# Patient Record
Sex: FEMALE | Race: ASIAN | URBAN - METROPOLITAN AREA
[De-identification: names, ages, dates, MRNs, and addresses within clinical notes are randomized per-mention and may not be internally consistent; named-entity substitution may affect disease eponyms.]

---

## 2023-11-29 LAB
HEP B, EXTERNAL RESULT: NEGATIVE
HIV, EXTERNAL RESULT: NEGATIVE

## 2024-01-05 LAB
ABO, EXTERNAL RESULT: NORMAL
RH FACTOR, EXTERNAL RESULT: POSITIVE
RUBELLA TITER, EXTERNAL RESULT: NORMAL

## 2024-06-14 LAB — GBS, EXTERNAL RESULT: NEGATIVE

## 2024-07-06 ENCOUNTER — HOSPITAL ENCOUNTER (INPATIENT)
Facility: HOSPITAL | Age: 34
LOS: 3 days | Discharge: HOME | End: 2024-07-09
Attending: OBSTETRICS & GYNECOLOGY | Admitting: OBSTETRICS & GYNECOLOGY
Payer: COMMERCIAL

## 2024-07-06 PROBLEM — Z37.9 NORMAL LABOR: Status: ACTIVE | Noted: 2024-07-06

## 2024-07-06 LAB
ERYTHROCYTE [DISTWIDTH] IN BLOOD BY AUTOMATED COUNT: 13.2 % (ref 11.5–14.5)
HCT VFR BLD AUTO: 34.4 % (ref 35–47)
HGB BLD-MCNC: 11.9 G/DL (ref 11.5–16)
MCH RBC QN AUTO: 31.4 PG (ref 26–34)
MCHC RBC AUTO-ENTMCNC: 34.6 G/DL (ref 30–36.5)
MCV RBC AUTO: 90.8 FL (ref 80–99)
NRBC # BLD: 0 K/UL (ref 0–0.01)
NRBC BLD-RTO: 0 PER 100 WBC
PLATELET # BLD AUTO: 118 K/UL (ref 150–400)
PMV BLD AUTO: 12 FL (ref 8.9–12.9)
RBC # BLD AUTO: 3.79 M/UL (ref 3.8–5.2)
WBC # BLD AUTO: 11.2 K/UL (ref 3.6–11)

## 2024-07-06 PROCEDURE — 36415 COLL VENOUS BLD VENIPUNCTURE: CPT

## 2024-07-06 PROCEDURE — 1100000000 HC RM PRIVATE

## 2024-07-06 PROCEDURE — 86901 BLOOD TYPING SEROLOGIC RH(D): CPT

## 2024-07-06 PROCEDURE — 4A1HXCZ MONITORING OF PRODUCTS OF CONCEPTION, CARDIAC RATE, EXTERNAL APPROACH: ICD-10-PCS | Performed by: OBSTETRICS & GYNECOLOGY

## 2024-07-06 PROCEDURE — 85027 COMPLETE CBC AUTOMATED: CPT

## 2024-07-06 PROCEDURE — 86592 SYPHILIS TEST NON-TREP QUAL: CPT

## 2024-07-06 PROCEDURE — 86900 BLOOD TYPING SEROLOGIC ABO: CPT

## 2024-07-06 PROCEDURE — 86850 RBC ANTIBODY SCREEN: CPT

## 2024-07-06 PROCEDURE — 6360000002 HC RX W HCPCS: Performed by: ADVANCED PRACTICE MIDWIFE

## 2024-07-06 PROCEDURE — 7210000100 HC LABOR FEE PER 1 HR: Performed by: OBSTETRICS & GYNECOLOGY

## 2024-07-06 RX ORDER — METHYLERGONOVINE MALEATE 0.2 MG/ML
200 INJECTION INTRAVENOUS PRN
Status: DISCONTINUED | OUTPATIENT
Start: 2024-07-06 | End: 2024-07-07

## 2024-07-06 RX ORDER — MISOPROSTOL 200 UG/1
400 TABLET ORAL PRN
Status: DISCONTINUED | OUTPATIENT
Start: 2024-07-06 | End: 2024-07-07

## 2024-07-06 RX ORDER — PROCHLORPERAZINE EDISYLATE 5 MG/ML
10 INJECTION INTRAMUSCULAR; INTRAVENOUS EVERY 6 HOURS PRN
Status: DISCONTINUED | OUTPATIENT
Start: 2024-07-06 | End: 2024-07-09 | Stop reason: HOSPADM

## 2024-07-06 RX ORDER — HYDROMORPHONE HYDROCHLORIDE 1 MG/ML
1 INJECTION, SOLUTION INTRAMUSCULAR; INTRAVENOUS; SUBCUTANEOUS
Status: DISCONTINUED | OUTPATIENT
Start: 2024-07-06 | End: 2024-07-07

## 2024-07-06 RX ORDER — SODIUM CHLORIDE, SODIUM LACTATE, POTASSIUM CHLORIDE, AND CALCIUM CHLORIDE .6; .31; .03; .02 G/100ML; G/100ML; G/100ML; G/100ML
500 INJECTION, SOLUTION INTRAVENOUS PRN
Status: DISCONTINUED | OUTPATIENT
Start: 2024-07-06 | End: 2024-07-07

## 2024-07-06 RX ORDER — ACETAMINOPHEN 325 MG/1
650 TABLET ORAL EVERY 4 HOURS PRN
Status: DISCONTINUED | OUTPATIENT
Start: 2024-07-06 | End: 2024-07-07

## 2024-07-06 RX ORDER — SODIUM CHLORIDE, SODIUM LACTATE, POTASSIUM CHLORIDE, CALCIUM CHLORIDE 600; 310; 30; 20 MG/100ML; MG/100ML; MG/100ML; MG/100ML
INJECTION, SOLUTION INTRAVENOUS CONTINUOUS
Status: DISCONTINUED | OUTPATIENT
Start: 2024-07-07 | End: 2024-07-07

## 2024-07-06 RX ORDER — ONDANSETRON 4 MG/1
4 TABLET, ORALLY DISINTEGRATING ORAL EVERY 6 HOURS PRN
Status: DISCONTINUED | OUTPATIENT
Start: 2024-07-06 | End: 2024-07-07

## 2024-07-06 RX ORDER — SODIUM CHLORIDE 9 MG/ML
25 INJECTION, SOLUTION INTRAVENOUS PRN
Status: DISCONTINUED | OUTPATIENT
Start: 2024-07-06 | End: 2024-07-07

## 2024-07-06 RX ORDER — ONDANSETRON 2 MG/ML
4 INJECTION INTRAMUSCULAR; INTRAVENOUS EVERY 6 HOURS PRN
Status: DISCONTINUED | OUTPATIENT
Start: 2024-07-06 | End: 2024-07-07

## 2024-07-06 RX ORDER — CARBOPROST TROMETHAMINE 250 UG/ML
250 INJECTION, SOLUTION INTRAMUSCULAR PRN
Status: DISCONTINUED | OUTPATIENT
Start: 2024-07-06 | End: 2024-07-07

## 2024-07-06 RX ORDER — TRANEXAMIC ACID 10 MG/ML
1000 INJECTION, SOLUTION INTRAVENOUS
Status: DISCONTINUED | OUTPATIENT
Start: 2024-07-06 | End: 2024-07-07

## 2024-07-06 RX ORDER — SODIUM CHLORIDE 0.9 % (FLUSH) 0.9 %
5-40 SYRINGE (ML) INJECTION EVERY 12 HOURS SCHEDULED
Status: DISCONTINUED | OUTPATIENT
Start: 2024-07-06 | End: 2024-07-07

## 2024-07-06 RX ORDER — SODIUM CHLORIDE 0.9 % (FLUSH) 0.9 %
5-40 SYRINGE (ML) INJECTION PRN
Status: DISCONTINUED | OUTPATIENT
Start: 2024-07-06 | End: 2024-07-07

## 2024-07-06 RX ORDER — TERBUTALINE SULFATE 1 MG/ML
0.25 INJECTION, SOLUTION SUBCUTANEOUS
Status: DISCONTINUED | OUTPATIENT
Start: 2024-07-06 | End: 2024-07-07

## 2024-07-06 RX ADMIN — PROCHLORPERAZINE EDISYLATE 10 MG: 5 INJECTION INTRAMUSCULAR; INTRAVENOUS at 23:47

## 2024-07-06 RX ADMIN — HYDROMORPHONE HYDROCHLORIDE 1 MG: 1 INJECTION, SOLUTION INTRAMUSCULAR; INTRAVENOUS; SUBCUTANEOUS at 23:47

## 2024-07-06 ASSESSMENT — PAIN DESCRIPTION - LOCATION: LOCATION: ABDOMEN;VAGINA

## 2024-07-06 ASSESSMENT — PAIN DESCRIPTION - DESCRIPTORS: DESCRIPTORS: PRESSURE;OTHER (COMMENT)

## 2024-07-06 ASSESSMENT — PAIN SCALES - GENERAL: PAINLEVEL_OUTOF10: 8

## 2024-07-06 ASSESSMENT — PAIN DESCRIPTION - ORIENTATION: ORIENTATION: LOWER

## 2024-07-07 ENCOUNTER — ANESTHESIA EVENT (OUTPATIENT)
Facility: HOSPITAL | Age: 34
End: 2024-07-07
Payer: COMMERCIAL

## 2024-07-07 ENCOUNTER — ANESTHESIA (OUTPATIENT)
Facility: HOSPITAL | Age: 34
End: 2024-07-07
Payer: COMMERCIAL

## 2024-07-07 LAB
ABO + RH BLD: NORMAL
BLOOD GROUP ANTIBODIES SERPL: NORMAL
RPR SER QL: NONREACTIVE
SPECIMEN EXP DATE BLD: NORMAL

## 2024-07-07 PROCEDURE — 6360000002 HC RX W HCPCS: Performed by: ADVANCED PRACTICE MIDWIFE

## 2024-07-07 PROCEDURE — 3700000025 EPIDURAL BLOCK: Performed by: STUDENT IN AN ORGANIZED HEALTH CARE EDUCATION/TRAINING PROGRAM

## 2024-07-07 PROCEDURE — 7210000100 HC LABOR FEE PER 1 HR: Performed by: OBSTETRICS & GYNECOLOGY

## 2024-07-07 PROCEDURE — 3700000156 HC EPIDURAL ANESTHESIA: Performed by: NURSE ANESTHETIST, CERTIFIED REGISTERED

## 2024-07-07 PROCEDURE — 6370000000 HC RX 637 (ALT 250 FOR IP): Performed by: ADVANCED PRACTICE MIDWIFE

## 2024-07-07 PROCEDURE — 6370000000 HC RX 637 (ALT 250 FOR IP): Performed by: OBSTETRICS & GYNECOLOGY

## 2024-07-07 PROCEDURE — 99212 OFFICE O/P EST SF 10 MIN: CPT

## 2024-07-07 PROCEDURE — 2500000003 HC RX 250 WO HCPCS: Performed by: NURSE ANESTHETIST, CERTIFIED REGISTERED

## 2024-07-07 PROCEDURE — 36415 COLL VENOUS BLD VENIPUNCTURE: CPT

## 2024-07-07 PROCEDURE — 94761 N-INVAS EAR/PLS OXIMETRY MLT: CPT

## 2024-07-07 PROCEDURE — 2580000003 HC RX 258: Performed by: ADVANCED PRACTICE MIDWIFE

## 2024-07-07 PROCEDURE — 51702 INSERT TEMP BLADDER CATH: CPT

## 2024-07-07 PROCEDURE — 7220000101 HC DELIVERY VAGINAL/SINGLE: Performed by: OBSTETRICS & GYNECOLOGY

## 2024-07-07 PROCEDURE — 0KQM0ZZ REPAIR PERINEUM MUSCLE, OPEN APPROACH: ICD-10-PCS | Performed by: OBSTETRICS & GYNECOLOGY

## 2024-07-07 PROCEDURE — 1120000000 HC RM PRIVATE OB

## 2024-07-07 PROCEDURE — 51701 INSERT BLADDER CATHETER: CPT

## 2024-07-07 PROCEDURE — 6360000002 HC RX W HCPCS: Performed by: NURSE ANESTHETIST, CERTIFIED REGISTERED

## 2024-07-07 RX ORDER — HYDROMORPHONE HYDROCHLORIDE 1 MG/ML
1 INJECTION, SOLUTION INTRAMUSCULAR; INTRAVENOUS; SUBCUTANEOUS
Status: DISCONTINUED | OUTPATIENT
Start: 2024-07-07 | End: 2024-07-09 | Stop reason: HOSPADM

## 2024-07-07 RX ORDER — FERROUS SULFATE 325(65) MG
325 TABLET ORAL 2 TIMES DAILY WITH MEALS
Status: DISCONTINUED | OUTPATIENT
Start: 2024-07-07 | End: 2024-07-09 | Stop reason: HOSPADM

## 2024-07-07 RX ORDER — OXYCODONE HYDROCHLORIDE 5 MG/1
10 TABLET ORAL EVERY 4 HOURS PRN
Status: DISCONTINUED | OUTPATIENT
Start: 2024-07-07 | End: 2024-07-09 | Stop reason: HOSPADM

## 2024-07-07 RX ORDER — ONDANSETRON 2 MG/ML
4 INJECTION INTRAMUSCULAR; INTRAVENOUS EVERY 6 HOURS PRN
Status: DISCONTINUED | OUTPATIENT
Start: 2024-07-07 | End: 2024-07-09 | Stop reason: HOSPADM

## 2024-07-07 RX ORDER — NALOXONE HYDROCHLORIDE 0.4 MG/ML
INJECTION, SOLUTION INTRAMUSCULAR; INTRAVENOUS; SUBCUTANEOUS PRN
Status: DISCONTINUED | OUTPATIENT
Start: 2024-07-07 | End: 2024-07-07

## 2024-07-07 RX ORDER — SODIUM CHLORIDE 9 MG/ML
INJECTION, SOLUTION INTRAVENOUS PRN
Status: DISCONTINUED | OUTPATIENT
Start: 2024-07-07 | End: 2024-07-09 | Stop reason: HOSPADM

## 2024-07-07 RX ORDER — ACETAMINOPHEN 500 MG
1000 TABLET ORAL EVERY 8 HOURS
Status: DISCONTINUED | OUTPATIENT
Start: 2024-07-07 | End: 2024-07-09 | Stop reason: HOSPADM

## 2024-07-07 RX ORDER — LANOLIN/MINERAL OIL
LOTION (ML) TOPICAL PRN
Status: DISCONTINUED | OUTPATIENT
Start: 2024-07-07 | End: 2024-07-09 | Stop reason: HOSPADM

## 2024-07-07 RX ORDER — SODIUM CHLORIDE 0.9 % (FLUSH) 0.9 %
5-40 SYRINGE (ML) INJECTION EVERY 12 HOURS SCHEDULED
Status: DISCONTINUED | OUTPATIENT
Start: 2024-07-07 | End: 2024-07-09 | Stop reason: HOSPADM

## 2024-07-07 RX ORDER — DOCUSATE SODIUM 100 MG/1
100 CAPSULE, LIQUID FILLED ORAL 2 TIMES DAILY
Status: DISCONTINUED | OUTPATIENT
Start: 2024-07-07 | End: 2024-07-09 | Stop reason: HOSPADM

## 2024-07-07 RX ORDER — EPHEDRINE SULFATE/0.9% NACL/PF 25 MG/5 ML
10 SYRINGE (ML) INTRAVENOUS
Status: DISCONTINUED | OUTPATIENT
Start: 2024-07-07 | End: 2024-07-07

## 2024-07-07 RX ORDER — LIDOCAINE HYDROCHLORIDE AND EPINEPHRINE BITARTRATE 20; .01 MG/ML; MG/ML
INJECTION, SOLUTION SUBCUTANEOUS PRN
Status: DISCONTINUED | OUTPATIENT
Start: 2024-07-07 | End: 2024-08-03 | Stop reason: SDUPTHER

## 2024-07-07 RX ORDER — IBUPROFEN 800 MG/1
800 TABLET ORAL EVERY 8 HOURS
Status: DISCONTINUED | OUTPATIENT
Start: 2024-07-07 | End: 2024-07-09 | Stop reason: HOSPADM

## 2024-07-07 RX ORDER — MISOPROSTOL 200 UG/1
800 TABLET ORAL PRN
Status: DISCONTINUED | OUTPATIENT
Start: 2024-07-07 | End: 2024-07-09 | Stop reason: HOSPADM

## 2024-07-07 RX ORDER — OXYCODONE HYDROCHLORIDE 5 MG/1
5 TABLET ORAL EVERY 4 HOURS PRN
Status: DISCONTINUED | OUTPATIENT
Start: 2024-07-07 | End: 2024-07-09 | Stop reason: HOSPADM

## 2024-07-07 RX ORDER — TRANEXAMIC ACID 10 MG/ML
1000 INJECTION, SOLUTION INTRAVENOUS
Status: ACTIVE | OUTPATIENT
Start: 2024-07-07 | End: 2024-07-08

## 2024-07-07 RX ORDER — FENTANYL/BUPIVACAINE/NS/PF 2-1250MCG
10 PLASTIC BAG, INJECTION (ML) INJECTION CONTINUOUS
Status: DISCONTINUED | OUTPATIENT
Start: 2024-07-07 | End: 2024-07-07

## 2024-07-07 RX ORDER — EPHEDRINE SULFATE/0.9% NACL/PF 25 MG/5 ML
5 SYRINGE (ML) INTRAVENOUS PRN
Status: DISCONTINUED | OUTPATIENT
Start: 2024-07-08 | End: 2024-07-07

## 2024-07-07 RX ORDER — BUPIVACAINE HYDROCHLORIDE 2.5 MG/ML
INJECTION, SOLUTION EPIDURAL; INFILTRATION; INTRACAUDAL PRN
Status: DISCONTINUED | OUTPATIENT
Start: 2024-07-07 | End: 2024-08-03 | Stop reason: SDUPTHER

## 2024-07-07 RX ORDER — SIMETHICONE 80 MG
80 TABLET,CHEWABLE ORAL EVERY 6 HOURS PRN
Status: DISCONTINUED | OUTPATIENT
Start: 2024-07-07 | End: 2024-07-09 | Stop reason: HOSPADM

## 2024-07-07 RX ORDER — SODIUM CHLORIDE 0.9 % (FLUSH) 0.9 %
5-40 SYRINGE (ML) INJECTION PRN
Status: DISCONTINUED | OUTPATIENT
Start: 2024-07-07 | End: 2024-07-09 | Stop reason: HOSPADM

## 2024-07-07 RX ORDER — BISACODYL 10 MG
10 SUPPOSITORY, RECTAL RECTAL DAILY PRN
Status: DISCONTINUED | OUTPATIENT
Start: 2024-07-07 | End: 2024-07-09 | Stop reason: HOSPADM

## 2024-07-07 RX ORDER — METHYLERGONOVINE MALEATE 0.2 MG/ML
200 INJECTION INTRAVENOUS PRN
Status: DISCONTINUED | OUTPATIENT
Start: 2024-07-07 | End: 2024-07-09 | Stop reason: HOSPADM

## 2024-07-07 RX ORDER — ONDANSETRON 4 MG/1
4 TABLET, ORALLY DISINTEGRATING ORAL EVERY 6 HOURS PRN
Status: DISCONTINUED | OUTPATIENT
Start: 2024-07-07 | End: 2024-07-09 | Stop reason: HOSPADM

## 2024-07-07 RX ADMIN — IBUPROFEN 800 MG: 800 TABLET, FILM COATED ORAL at 23:51

## 2024-07-07 RX ADMIN — OXYTOCIN 2 MILLI-UNITS/MIN: 10 INJECTION, SOLUTION INTRAMUSCULAR; INTRAVENOUS at 07:50

## 2024-07-07 RX ADMIN — Medication 10 ML/HR: at 11:58

## 2024-07-07 RX ADMIN — IBUPROFEN 800 MG: 800 TABLET, FILM COATED ORAL at 16:05

## 2024-07-07 RX ADMIN — SODIUM CHLORIDE, POTASSIUM CHLORIDE, SODIUM LACTATE AND CALCIUM CHLORIDE: 600; 310; 30; 20 INJECTION, SOLUTION INTRAVENOUS at 01:24

## 2024-07-07 RX ADMIN — BUPIVACAINE HYDROCHLORIDE 10 ML: 2.5 INJECTION, SOLUTION EPIDURAL; INFILTRATION; INTRACAUDAL; PERINEURAL at 02:57

## 2024-07-07 RX ADMIN — LIDOCAINE HYDROCHLORIDE,EPINEPHRINE BITARTRATE 3 ML: 20; .01 INJECTION, SOLUTION INFILTRATION; PERINEURAL at 02:50

## 2024-07-07 RX ADMIN — Medication 10 ML/HR: at 03:30

## 2024-07-07 RX ADMIN — DOCUSATE SODIUM 100 MG: 100 CAPSULE, LIQUID FILLED ORAL at 21:09

## 2024-07-07 RX ADMIN — SODIUM CHLORIDE, POTASSIUM CHLORIDE, SODIUM LACTATE AND CALCIUM CHLORIDE: 600; 310; 30; 20 INJECTION, SOLUTION INTRAVENOUS at 02:36

## 2024-07-07 RX ADMIN — ACETAMINOPHEN 1000 MG: 500 TABLET ORAL at 21:08

## 2024-07-07 RX ADMIN — Medication 166.7 ML: at 14:25

## 2024-07-07 RX ADMIN — FERROUS SULFATE TAB 325 MG (65 MG ELEMENTAL FE) 325 MG: 325 (65 FE) TAB at 16:05

## 2024-07-07 RX ADMIN — ACETAMINOPHEN 650 MG: 325 TABLET ORAL at 13:17

## 2024-07-07 RX ADMIN — SODIUM CHLORIDE, POTASSIUM CHLORIDE, SODIUM LACTATE AND CALCIUM CHLORIDE: 600; 310; 30; 20 INJECTION, SOLUTION INTRAVENOUS at 07:49

## 2024-07-07 ASSESSMENT — PAIN - FUNCTIONAL ASSESSMENT
PAIN_FUNCTIONAL_ASSESSMENT: ACTIVITIES ARE NOT PREVENTED
PAIN_FUNCTIONAL_ASSESSMENT: ACTIVITIES ARE NOT PREVENTED

## 2024-07-07 ASSESSMENT — PAIN DESCRIPTION - ORIENTATION
ORIENTATION: LOWER
ORIENTATION: LOWER

## 2024-07-07 ASSESSMENT — PAIN DESCRIPTION - LOCATION
LOCATION: ABDOMEN
LOCATION: PERINEUM

## 2024-07-07 ASSESSMENT — PAIN SCALES - GENERAL: PAINLEVEL_OUTOF10: 5

## 2024-07-07 ASSESSMENT — PAIN DESCRIPTION - DESCRIPTORS
DESCRIPTORS: SORE;ACHING;BURNING
DESCRIPTORS: ACHING;SORE

## 2024-07-07 NOTE — PROGRESS NOTES
Labor Progress Note  Patient seen, fetal heart rate and contraction pattern evaluated, patient examined.  Comfortable with epidural.    No data found.    Physical Exam:  Cervical Exam:  /-2 AROM scant clear fluid  Uterine Activity: not tracing well  pit at 6 mu/min  Fetal Heart Rate: Reactive  EFW 7 lb by leopold    Assessment/Plan:  IUP at 39w4d labor with pit augmentation    Reassuring fetal status  GBS  neg  Continue to titrate pitocin to achieve regular ctx.  Reposition prn.  Discussion with patient regarding history of shoulder dystocia (8lb 4oz relieved by episiotomy, Kaylyn/SPP/del of posterior arm).  Pt counseled about recurrence and associated risks to mom/baby.  She declines c/s. Heat to perineum per request.  Expect  will prepare for shoulder dystocia given history.    Rohiin Cummings DO, FACOG  [facility]

## 2024-07-07 NOTE — PROGRESS NOTES
2144: JATIN Stevenson CNM at bedside discussing POC.    2145: Assumed care of pt ambulatory from home complaining of contractions Q5-6 minutes. Contractions started night prior and are focused in lower abdominal area. Endorses positive fetal mov't. Denies LOF, vaginal bleeding, headaches, vision changes, epigastric pain, and/or N/V. EFM/toco currently applied.    2202: Pt to be removed from EFM/toco per JATIN Stevenson CNM. Okay to use birthing ball and/or walk george to assist with discomfort. Will reassess cervix in an hour.    2230: Provided pt heat pack for contraction discomfort.    2240: Attempted to place pt back on EFM/toco for 20 minute strip prior to cervical recheck by CNM. Pt refusing EFM/toco. Pt squatting by sink complaining of pain. Notified JATIN Stevenson CNM. To come to bedside to assess.    2253: JATIN Stevenson CNM at bedside to discuss POC and admission to inpatient.    0047: Heat pack provided to pt for back discomfort.    0124: Pt requesting IVF bolus for epidural due to continued discomfort despite IV pain medication. IVF bolus started.    0213: SHELBY Shirley CRNA notified pt finishing up IVF bolus for epidural. To come to bedside.    0236: SHELBY Shirley CRNA at bedside for epidural placement.    0243: Epidural time out conducted with SHELBY Shirley CRNA.    0250: Epidural test dose administered by SHELBY Shirley CRNA.    0257: Epidural bolus dose administered by SHELBY Shirley CRNA.    0700: Bedside and Verbal shift change report given to Randi CASH (oncoming nurse) by Jerrica CASH (offgoing nurse). Report included the following information Nurse Handoff Report, Index, Adult Overview, Intake/Output, MAR, Recent Results, and Quality Measures.

## 2024-07-07 NOTE — PROGRESS NOTES
0700 - Verbal shift change report given to SHAILESH Bryan (oncoming nurse) by SHAILESH Becerril (offgoing nurse). Report included the following information Nurse Handoff Report.     1001 - SVE by Dr. Cummings, patient 5/90/-2. AROM with clear, scant amount of fluid.    1200 - SVE by Dr. Cummings, patient lip/100/+1.     1325 - SVE by Dr. Cummings, patient 10/100/+1.    1330 - Patient actively pushing. RN remains in continuous attendance at the bedside. Assessment & evaluation of fetal heart rate ongoing via continuous EFM.    1421 - RN remained at bedside throughout pushing. EFM continuously assessed. Vaginal delivery of viable infant.    1820 - TRANSFER - OUT REPORT:    Verbal report given to SHAILESH Becerril on Emi Mares being transferred to SHAILESH Bryan for routine progression of patient care       Report consisted of patient's Situation, Background, Assessment and   Recommendations(SBAR).     Information from the following report(s) Nurse Handoff Report was reviewed with the receiving nurse.           Lines:   Peripheral IV 07/06/24 Left;Proximal;Anterior Forearm (Active)   Site Assessment Clean, dry & intact 07/07/24 0717   Line Status Infusing 07/07/24 0717   Line Care Connections checked and tightened 07/07/24 0717   Phlebitis Assessment No symptoms 07/07/24 0717   Infiltration Assessment 0 07/07/24 0717   Alcohol Cap Used Yes 07/07/24 0717   Dressing Status Clean, dry & intact 07/07/24 0717   Dressing Type Transparent 07/07/24 0717   Dressing Intervention New 07/06/24 3939        Opportunity for questions and clarification was provided.      Patient transported with:  Registered Nurse

## 2024-07-07 NOTE — PROGRESS NOTES
Labor Progress Note     Patient: Emi Mares MRN: [id number]  SSN: [id number]    YOB: 1990  Age: 34 y.o.  Sex: female        Subjective:   Patient evaluated and requesting epidural s/p dilaudid 2 hours ago   Objective:   Blood pressure 127/69, pulse 92, temperature 97.9 °F (36.6 °C), temperature source Oral, resp. rate 20, SpO2 95 %.    Sterile Vaginal Exam: deferred  Membranes:  intact  EFM:  - Fetal Heart Rate: reactive     - Uterine Activity: every 5-6 min        Assessment:    SIUP @ 39w4d   GBS neg  Early Labor  Category 1 fetal heart rate tracing   Plan:   Expectant management   Pain management : bolus for epidural   All questions answered, and pt/partner agree with plan of care  Anticipate     Signed By:  BERTHA Barreto CNM      2024 2:14 AM

## 2024-07-07 NOTE — PROGRESS NOTES
Labor Progress Note  Patient seen, fetal heart rate and contraction pattern evaluated, patient examined.  Patient Vitals for the past 2 hrs:   BP Temp Temp src Pulse   07/07/24 1307 120/62 -- -- (!) 102   07/07/24 1252 126/74 -- -- (!) 110   07/07/24 1238 127/61 -- -- (!) 104   07/07/24 1221 120/67 -- -- 87   07/07/24 1207 124/70 -- -- (!) 102   07/07/24 1200 -- 98 °F (36.7 °C) Oral --   07/07/24 1151 119/74 -- -- 96   07/07/24 1136 121/69 -- -- 85       Physical Exam:  Cervical Exam:  complete/+2  Uterine Activity: q 2 min  Fetal Heart Rate: Reactive    Assessment/Plan:  IUP at 39w4d labor/pit    Reassuring fetal status  GBS neg  Start pushing    Rohini Cummings, DO, FACOG  [facility]

## 2024-07-07 NOTE — DISCHARGE INSTRUCTIONS
pain that does not get better after you take pain medicine.  Sudden chest pain and shortness of breath  Signs of a blood clot: pain/ swelling/ increasing redness in your lower extremeties  Signs of infection: increased pain in your abdomen or vaginal area; red streaks, warmth, or tenderness of your breasts; fever of 100.5 F or greater

## 2024-07-07 NOTE — PROGRESS NOTES
Labor Progress Note  Patient seen, fetal heart rate and contraction pattern evaluated, patient examined.  Patient Vitals for the past 2 hrs:   BP Pulse   07/07/24 1307 120/62 (!) 102   07/07/24 1252 126/74 (!) 110   07/07/24 1238 127/61 (!) 104   07/07/24 1221 120/67 87   07/07/24 1207 124/70 (!) 102       Physical Exam:  Cervical Exam:  c/p  Uterine Activity: q 2-3 min  Fetal Heart Rate: Reactive cat 2 reassuring    Assessment/Plan:  IUP at 39w4d labor/pit    Reassuring fetal status  GBS neg  Pt pushing well, prepped for shoulder dystocia      Rohini Cummings DO, FACOG  [facility]

## 2024-07-07 NOTE — DISCHARGE SUMMARY
Obstetrical Discharge Summary     Name: Emi Mares MRN: [id number]  SSN: [id number]    YOB: 1990  Age: 34 y.o.  Sex: female      Allergies: Patient has no known allergies.    Admit Date: 2024    Discharge Date: 2024     Admitting Physician: Britton Benedict MD     Attending Physician:  Marycruz Johnson MD     * Admission Diagnoses: Normal labor [O80, Z37.9]    * Discharge Diagnoses:   Information for the patient's :  Joe Mares [[id number]]   @[id number]@      Additional Diagnoses:    Lab Results   Component Value Date/Time    ABORH A POSITIVE 2024 11:31 PM      There is no immunization history on file for this patient.    * Procedures:   * No surgery found *           * Discharge Condition: good    * Hospital Course: Normal hospital course following the delivery.    * Disposition: Home    Discharge Medications:      Medication List        ASK your doctor about these medications      PRENATAL 1 + IRON PO              * Follow-up Care/Patient Instructions:  Activity: Activity as tolerated  Diet: Regular Diet  Wound Care: As directed    [unfilled]

## 2024-07-07 NOTE — H&P
History & Physical    Name: Emi Mares MRN: [id number]  SSN: [id number]    YOB: 1990  Age: 34 y.o.  Sex: female        Subjective:     Estimated Date of Delivery: 7/10/24  OB History          4    Para        Term                AB   2    Living   1         SAB        IAB        Ectopic        Molar        Multiple        Live Births                    Ms. Mares is admitted with pregnancy at 39w3d with complaints of contractions since last night. Reports they were every 15 min last night, every 30 min throughout the day today and then reports every 8 min this evening. Denies LOF, bleeding. +FM . Prenatal course was normal. Please see prenatal records for details.    Past Medical History:   Diagnosis Date    History of shoulder dystocia in prior pregnancy      Past Surgical History:   Procedure Laterality Date    EYE SURGERY      eye injury repair as a child    WISDOM TOOTH EXTRACTION       Social History     Occupational History    Not on file   Tobacco Use    Smoking status: Never    Smokeless tobacco: Never   Substance and Sexual Activity    Alcohol use: Not Currently    Drug use: Never    Sexual activity: Yes     No family history on file.    No Known Allergies  Prior to Admission medications    Medication Sig Start Date End Date Taking? Authorizing Provider   Prenatal Multivit-Min-Fe-FA (PRENATAL 1 + IRON PO) Take by mouth   Yes Provider, Shantell, MD        Review of Systems: Pertinent items are noted in HPI.    Objective:     Vitals:  BP: 123/65  HR 84  SPO2 97%    Physical Exam:  Patient with distress  Heart: Regular rate and rhythm  Lung: no wheezes, no rales, no rhonchi and normal respiratory effort  Back costovertebral angle tenderness: absent  Abdomen: soft, nontender  Fundus: soft and non tender  Perineum: blood absent, amniotic fluid absent  Cervical Exam: 1 cm dilated    50% effaced    -3 station    Presenting Part: cephalic  Cervical Position: posterior  Lower

## 2024-07-07 NOTE — PROGRESS NOTES
Labor Progress Note     Patient: Emi Mares MRN: [id number]  SSN: [id number]    YOB: 1990  Age: 34 y.o.  Sex: female        Subjective:   Patient evaluated and is coping well with contractions, resting comfortably with epidural   Objective:   Blood pressure (!) 110/46, pulse 96, temperature 97.9 °F (36.6 °C), temperature source Oral, resp. rate 18, height 1.676 m (5' 6\"), weight 76.2 kg (168 lb), SpO2 94 %.    Sterile Vaginal Exam: deferred  Membranes:  intact  EFM:  - Fetal Heart Rate:reactive     - Uterine Activity: irregular        Assessment:    SIUP @ 39w4d  GBS neg  Labor  Category 1 fetal heart rate tracing   Plan:   Contractions spaced, discussed risks/benefits of AOL with pitocin.   Patient agrees to AOL with pitocin   Titrate pitocin to achieve adequate contractions   All questions answered, and pt/partner agree with plan of care  Anticipate     Signed By:  BERTHA Barreto CNM      2024 4:57 AM

## 2024-07-07 NOTE — ANESTHESIA PRE PROCEDURE
Department of Anesthesiology  Preprocedure Note       Name:  Emi Mares   Age:  34 y.o.  :  1990                                          MRN:  [id number]         Date:  2024      Surgeon: * No surgeons listed *    Procedure: * No procedures listed *    Medications prior to admission:   Prior to Admission medications    Medication Sig Start Date End Date Taking? Authorizing Provider   Prenatal Multivit-Min-Fe-FA (PRENATAL 1 + IRON PO) Take by mouth   Yes Provider, MD Shantell       Current medications:    Current Facility-Administered Medications   Medication Dose Route Frequency Provider Last Rate Last Admin   • terbutaline (BRETHINE) injection 0.25 mg  0.25 mg SubCUTAneous Once PRN Deloglos, Stephenia, APRN - CNM       • lactated ringers IV soln infusion   IntraVENous Continuous Louieogldominga, Stephenia, APRN -  mL/hr at 24 0124 New Bag at 24 0124   • lactated ringers bolus 500 mL  500 mL IntraVENous PRN Delogldominga, Stephenia, APRN - CNM        Or   • lactated ringers bolus 500 mL  500 mL IntraVENous PRN Deloglos, Stephenia, APRN - CNM       • sodium chloride flush 0.9 % injection 5-40 mL  5-40 mL IntraVENous 2 times per day Deloglos, Stephenia, APRN - CNM       • sodium chloride flush 0.9 % injection 5-40 mL  5-40 mL IntraVENous PRN Deloglos, Stephenia, APRN - CNM       • 0.9 % sodium chloride infusion  25 mL IntraVENous PRN Deloglos, Stephenia, APRN - CNM       • ondansetron (ZOFRAN) injection 4 mg  4 mg IntraVENous Q6H PRN Deloglos, Stephenia, APRN - CNM        Or   • ondansetron (ZOFRAN-ODT) disintegrating tablet 4 mg  4 mg Oral Q6H PRN Deloglos, Stephenia, APRN - CNM       • oxytocin (PITOCIN) 30 units in 500 mL infusion  87.3 julieta-units/min IntraVENous Continuous PRN Deloglos, Stephenia, APRN - CNM        And   • oxytocin (PITOCIN) 10 unit bolus from the bag  10 Units IntraVENous PRN Deloglos, Stephenia, APRN - CNM       • methylergonovine (METHERGINE) injection 200 mcg  200

## 2024-07-07 NOTE — PROGRESS NOTES
Pt presented with complaints contractions 5-6 mins apart. Denies ROM or decreased fetal movement.  CNM notified of pt arrival and SVE and contractions 5-6 mins apart.

## 2024-07-07 NOTE — ANESTHESIA PROCEDURE NOTES
Epidural Block    Patient location during procedure: OB  Start time: 7/7/2024 2:43 AM  End time: 7/7/2024 3:00 AM  Reason for block: labor epidural  Staffing  Resident/CRNA: Evelio Shirley APRN - CRNA  Performed by: Evelio Shirley APRN - CRNA  Authorized by: Beltran Plata Jr., DO    Epidural  Patient position: sitting  Prep: ChloraPrep  Patient monitoring: continuous pulse ox and frequent blood pressure checks  Approach: midline  Location: L4-5  Injection technique: KEISHA saline  Guidance: paresthesia technique  Provider prep: mask and sterile gloves  Needle  Needle type: Tuohy   Needle gauge: 17 G  Needle length: 3.5 in  Needle insertion depth: 5 cm  Catheter type: multi-orifice  Catheter size: 20 G  Catheter at skin depth: 10 cm  Test dose: negativeCatheter Secured: tegaderm and tape  Assessment  Sensory level: T6  Hemodynamics: stable  Attempts: 1  Outcomes: uncomplicated and patient tolerated procedure well  Preanesthetic Checklist  Completed: patient identified, IV checked, site marked, risks and benefits discussed, surgical/procedural consents, equipment checked, pre-op evaluation, timeout performed, anesthesia consent given, oxygen available, monitors applied/VS acknowledged, fire risk safety assessment completed and verbalized and blood product R/B/A discussed and consented

## 2024-07-07 NOTE — PROGRESS NOTES
Labor Progress Note  Patient seen, fetal heart rate and contraction pattern evaluated, patient examined.  Patient Vitals for the past 2 hrs:   BP Pulse SpO2   07/07/24 1151 119/74 96 --   07/07/24 1136 121/69 85 --   07/07/24 1121 115/72 87 --   07/07/24 1108 117/60 96 --   07/07/24 1052 (!) 93/47 (!) 109 96 %   07/07/24 1036 (!) 101/59 94 --   07/07/24 1022 (!) 99/52 (!) 102 --   07/07/24 1013 110/61 (!) 105 96 %       Physical Exam:  Cervical Exam:  anterior lip/+1  Uterine Activity: not tracing well  Fetal Heart Rate: Reactive cat 1    Assessment/Plan:  IUP at 39w4d labor/pit aug    Reassuring fetal status  GBS neg    Rohini Cummings DO, FACOG  [facility]

## 2024-07-08 PROCEDURE — 1120000000 HC RM PRIVATE OB

## 2024-07-08 PROCEDURE — 6370000000 HC RX 637 (ALT 250 FOR IP): Performed by: OBSTETRICS & GYNECOLOGY

## 2024-07-08 PROCEDURE — 2700000000 HC OXYGEN THERAPY PER DAY

## 2024-07-08 PROCEDURE — 94761 N-INVAS EAR/PLS OXIMETRY MLT: CPT

## 2024-07-08 RX ADMIN — DOCUSATE SODIUM 100 MG: 100 CAPSULE, LIQUID FILLED ORAL at 08:52

## 2024-07-08 RX ADMIN — IBUPROFEN 800 MG: 800 TABLET, FILM COATED ORAL at 08:52

## 2024-07-08 RX ADMIN — IBUPROFEN 800 MG: 800 TABLET, FILM COATED ORAL at 17:04

## 2024-07-08 RX ADMIN — ACETAMINOPHEN 1000 MG: 500 TABLET ORAL at 06:17

## 2024-07-08 RX ADMIN — DOCUSATE SODIUM 100 MG: 100 CAPSULE, LIQUID FILLED ORAL at 21:59

## 2024-07-08 RX ADMIN — ACETAMINOPHEN 1000 MG: 500 TABLET ORAL at 21:59

## 2024-07-08 RX ADMIN — ACETAMINOPHEN 1000 MG: 500 TABLET ORAL at 14:11

## 2024-07-08 ASSESSMENT — PAIN DESCRIPTION - DESCRIPTORS
DESCRIPTORS: ACHING;SORE
DESCRIPTORS: CRAMPING;DISCOMFORT

## 2024-07-08 ASSESSMENT — PAIN DESCRIPTION - LOCATION
LOCATION: ABDOMEN
LOCATION: ABDOMEN

## 2024-07-08 ASSESSMENT — PAIN SCALES - GENERAL
PAINLEVEL_OUTOF10: 4
PAINLEVEL_OUTOF10: 4

## 2024-07-08 ASSESSMENT — PAIN DESCRIPTION - ORIENTATION
ORIENTATION: LOWER
ORIENTATION: LOWER

## 2024-07-08 NOTE — PROGRESS NOTES
PostPartum Note    Emi Mares  [id number]  1990  34 y.o.    S:  Ms. Emi Mares is a 34 y.o.  PPD #1 s/p  @ 39w4d.  Doing well.  She had a baby boy.  Her lochia is like a period.  She describes her pain as mild and is well controlled with PO medications. She is ambulating and voiding.  Tolerating PO intake.      O:   /75   Pulse (!) 101   Temp 98.2 °F (36.8 °C) (Oral)   Resp 16   Ht 1.676 m (5' 6\")   Wt 76.2 kg (168 lb)   SpO2 99%   Breastfeeding Unknown   BMI 27.12 kg/m²     Gen - No acute distress  Respirations - nonlabored   Abdomen - Fundus firm below the umbilicus   Ext - Warm, well perfused, nontender     Lab Results   Component Value Date/Time    WBC 11.2 2024 11:31 PM    HGB 11.9 2024 11:31 PM    HCT 34.4 2024 11:31 PM     2024 11:31 PM    MCV 90.8 2024 11:31 PM     A/P:  34 y.o.  PPD #1 s/p  @ 39w4d doing well.    1.  Routine PP instructions/ care discussed  2.  Blood type - Rh pos  3.  Rubella immune  4.  Circumcision not desired  5.  Discharge PPD 2   6.  F/U 4-6 weeks for PP check.        Viviane Carias MD  [facility]

## 2024-07-08 NOTE — CARE COORDINATION
7/8/2024  2:31 PM    CM met with HJON to complete initial assessment and begin discharge planning.  MOB verified and confirmed demographics.  JHON lives with FOB, at the address on file.  JHON reports she has good family support, and feels like she has the support she needs when she returns home.  JHON plans to breast feed baby and has pump to use at home.  Bossman chandler, will provide follow up care for infant. JHON has car seat, bassinet/crib, clothing, bottles and all necessary supplies for baby. JHON has Commercial insurance, and will be adding baby to FOB's policy. CM discussed process to add baby to insurance, JHON verbalized understanding.       07/08/24 1431   Service Assessment   Patient Orientation Alert and Oriented   Cognition Alert   History Provided By Patient   Primary Caregiver Self   Support Systems Spouse/Significant Other   PCP Verified by CM Yes   Last Visit to PCP Within last 3 months   Prior Functional Level Independent in ADLs/IADLs   Current Functional Level Independent in ADLs/IADLs   Can patient return to prior living arrangement Yes   Ability to make needs known: Good   Family able to assist with home care needs: Yes   Would you like for me to discuss the discharge plan with any other family members/significant others, and if so, who? No   Financial Resources Other (Comment)     Eloy Vasquez CM

## 2024-07-09 VITALS
DIASTOLIC BLOOD PRESSURE: 68 MMHG | SYSTOLIC BLOOD PRESSURE: 105 MMHG | RESPIRATION RATE: 15 BRPM | HEIGHT: 66 IN | HEART RATE: 89 BPM | WEIGHT: 168 LBS | BODY MASS INDEX: 27 KG/M2 | TEMPERATURE: 97.7 F | OXYGEN SATURATION: 98 %

## 2024-07-09 PROCEDURE — 6370000000 HC RX 637 (ALT 250 FOR IP): Performed by: OBSTETRICS & GYNECOLOGY

## 2024-07-09 PROCEDURE — 94761 N-INVAS EAR/PLS OXIMETRY MLT: CPT

## 2024-07-09 RX ADMIN — DOCUSATE SODIUM 100 MG: 100 CAPSULE, LIQUID FILLED ORAL at 10:04

## 2024-07-09 RX ADMIN — IBUPROFEN 800 MG: 800 TABLET, FILM COATED ORAL at 10:05

## 2024-07-09 RX ADMIN — ACETAMINOPHEN 1000 MG: 500 TABLET ORAL at 07:00

## 2024-07-09 RX ADMIN — IBUPROFEN 800 MG: 800 TABLET, FILM COATED ORAL at 01:59

## 2024-07-09 ASSESSMENT — PAIN DESCRIPTION - ORIENTATION
ORIENTATION: LOWER;UPPER
ORIENTATION: LOWER

## 2024-07-09 ASSESSMENT — PAIN SCALES - GENERAL
PAINLEVEL_OUTOF10: 5
PAINLEVEL_OUTOF10: 5

## 2024-07-09 ASSESSMENT — PAIN DESCRIPTION - DESCRIPTORS
DESCRIPTORS: DISCOMFORT;SORE
DESCRIPTORS: CRAMPING;DISCOMFORT

## 2024-07-09 ASSESSMENT — PAIN DESCRIPTION - LOCATION: LOCATION: ABDOMEN

## 2024-07-09 NOTE — LACTATION NOTE
face and increase optimal latch.      If you or your baby are working through some latch difficulties, syringe feeding is another way of giving  your baby colostrum or breastmilk.  Syringe feeding can be used when you need to give your baby small amounts of colostrum or expressed breast milk (less than 5ml at a time). This is usually during the first couple of days after your baby's birth.     Breastmilk or colostrum can be expressed via hand, or pump and transferred to small sterile periodontal (or other) syringe. Baby should be held in an upright position and gently syringe no more than 0.2mls (millilitres) into your baby’s mouth at a time. Feed the milk in between their gum and cheek.  Offer a clean or gloved finger and allow baby to suck while receiving milk, teaching them to be an active participant in the feeding process. Allow your baby to swallow before giving them another 0.2mls (millilitres) and continue to do this until the feed has ended.     Care for sore/tender nipples discussed:  ways to improve positioning and latch practiced and discussed, hand express colostrum after feedings and let air dry, light application of lanolin, hydrogel pads, seek comfortable laid back feeding position, start feedings on least sore side first.    Pt will successfully establish breastfeeding by feeding in response to early feeding cues   or wake every 3h, will obtain deep latch, and will keep log of feedings/output.  Taught to BF at hunger cues and or q 2-3 hrs and to offer 10-20 drops of hand expressed colostrum at any non-feeds.      Left Breast: Firm, Filling  Left Nipple: Protrude, Sore  Right Nipple: Protrude, Sore  Right Breast: Filling, Firm  Position and Latch: With assistance  Signs of Transfer: Nutritive sucking  Maternal Response: Attentive  Infant Supplementation: Expressed Breast Milk, Finger Fed        Latch: Grasps breast, tongue down, lips flanged, rhythmic sucking  Audible Swallowing: Spontaneous and

## 2024-07-09 NOTE — PROGRESS NOTES
1045 Discharge completed. Awaiting transportation.    Pt off unit in stable condition via wheelchair with volunteers for discharge home per Dr. Bruner. Pt is aware to follow-up in 6 weeks. Pt denies any HA, dizziness, N/V or pain at this time. Infant in car seat and discharged with mother.

## 2024-07-09 NOTE — PROGRESS NOTES
PostPartum Note    Emi Mares  [id number]  1990  34 y.o.    S:  Ms. Emi Mares is a 34 y.o.  PPD #2 s/p  @ 39w4d.  Doing well.  She had a baby boy.  Her lochia is like a period.  She describes her pain as mild and is well controlled with PO medications.   She is ambulating and voiding.  Tolerating PO intake.      O:   /68   Pulse 89   Temp 97.7 °F (36.5 °C) (Oral)   Resp 15   Ht 1.676 m (5' 6\")   Wt 76.2 kg (168 lb)   SpO2 98%   Breastfeeding Unknown   BMI 27.12 kg/m²     Lab Results   Component Value Date/Time    WBC 11.2 2024 11:31 PM    HGB 11.9 2024 11:31 PM    HCT 34.4 2024 11:31 PM     2024 11:31 PM    MCV 90.8 2024 11:31 PM       Gen - No acute distress  Abdomen - Fundus firm, below the umbilicus   Ext - Warm, well perfused.  Nontender    A/P:  PPD #2 s/p  @ 39w4d doing well.    1.  Routine PP instructions/ care discussed  2.  Circumcision not desired   3.  Discharge today   4.  F/U 4-6 weeks for PP check.      Sierra Bruner MD  [facility]

## 2024-08-03 NOTE — ANESTHESIA POSTPROCEDURE EVALUATION
Department of Anesthesiology  Postprocedure Note    Patient: Emi Mares  MRN: [id number]  YOB: 1990  Date of evaluation: 8/3/2024    Procedure Summary       Date: 07/07/24 Room / Location:     Anesthesia Start: 0226 Anesthesia Stop:     Procedure: Labor Analgesia Diagnosis:     Scheduled Providers:  Responsible Provider: Beltran Plata Jr., DO    Anesthesia Type: regional, epidural ASA Status: 2            Anesthesia Type: No value filed.    Susan Phase I:      Susan Phase II:      Anesthesia Post Evaluation    Patient location during evaluation: floor  Patient participation: complete - patient cannot participate  Level of consciousness: awake and alert  Pain score: 0  Airway patency: patent  Nausea & Vomiting: no nausea  Cardiovascular status: blood pressure returned to baseline  Respiratory status: acceptable  Hydration status: euvolemic  Pain management: adequate    No notable events documented.